# Patient Record
Sex: MALE | Race: WHITE | HISPANIC OR LATINO | URBAN - METROPOLITAN AREA
[De-identification: names, ages, dates, MRNs, and addresses within clinical notes are randomized per-mention and may not be internally consistent; named-entity substitution may affect disease eponyms.]

---

## 2019-02-12 ENCOUNTER — OUTPATIENT (OUTPATIENT)
Dept: OUTPATIENT SERVICES | Facility: HOSPITAL | Age: 54
LOS: 1 days | End: 2019-02-12
Payer: COMMERCIAL

## 2019-02-12 VITALS
TEMPERATURE: 98 F | WEIGHT: 137.79 LBS | RESPIRATION RATE: 20 BRPM | HEIGHT: 64 IN | HEART RATE: 79 BPM | DIASTOLIC BLOOD PRESSURE: 95 MMHG | SYSTOLIC BLOOD PRESSURE: 140 MMHG

## 2019-02-12 DIAGNOSIS — Z01.818 ENCOUNTER FOR OTHER PREPROCEDURAL EXAMINATION: ICD-10-CM

## 2019-02-12 DIAGNOSIS — I10 ESSENTIAL (PRIMARY) HYPERTENSION: ICD-10-CM

## 2019-02-12 DIAGNOSIS — Z90.49 ACQUIRED ABSENCE OF OTHER SPECIFIED PARTS OF DIGESTIVE TRACT: Chronic | ICD-10-CM

## 2019-02-12 DIAGNOSIS — Z12.11 ENCOUNTER FOR SCREENING FOR MALIGNANT NEOPLASM OF COLON: ICD-10-CM

## 2019-02-12 LAB
ANION GAP SERPL CALC-SCNC: 11 MMOL/L — SIGNIFICANT CHANGE UP (ref 5–17)
APTT BLD: 34.2 SEC — SIGNIFICANT CHANGE UP (ref 27.5–36.3)
BASOPHILS # BLD AUTO: 0 K/UL — SIGNIFICANT CHANGE UP (ref 0–0.2)
BUN SERPL-MCNC: 23 MG/DL — HIGH (ref 8–20)
CALCIUM SERPL-MCNC: 9.9 MG/DL — SIGNIFICANT CHANGE UP (ref 8.6–10.2)
CHLORIDE SERPL-SCNC: 101 MMOL/L — SIGNIFICANT CHANGE UP (ref 98–107)
CO2 SERPL-SCNC: 28 MMOL/L — SIGNIFICANT CHANGE UP (ref 22–29)
CREAT SERPL-MCNC: 0.84 MG/DL — SIGNIFICANT CHANGE UP (ref 0.5–1.3)
EOSINOPHIL # BLD AUTO: 0.1 K/UL — SIGNIFICANT CHANGE UP (ref 0–0.5)
EOSINOPHIL NFR BLD AUTO: 2 % — SIGNIFICANT CHANGE UP (ref 0–6)
GLUCOSE SERPL-MCNC: 87 MG/DL — SIGNIFICANT CHANGE UP (ref 70–115)
HCT VFR BLD CALC: 44.1 % — SIGNIFICANT CHANGE UP (ref 42–52)
HGB BLD-MCNC: 15.6 G/DL — SIGNIFICANT CHANGE UP (ref 14–18)
INR BLD: 0.97 RATIO — SIGNIFICANT CHANGE UP (ref 0.88–1.16)
LYMPHOCYTES # BLD AUTO: 3.1 K/UL — SIGNIFICANT CHANGE UP (ref 1–4.8)
LYMPHOCYTES # BLD AUTO: 56 % — HIGH (ref 20–55)
MCHC RBC-ENTMCNC: 28.9 PG — SIGNIFICANT CHANGE UP (ref 27–31)
MCHC RBC-ENTMCNC: 35.4 G/DL — SIGNIFICANT CHANGE UP (ref 32–36)
MCV RBC AUTO: 81.8 FL — SIGNIFICANT CHANGE UP (ref 80–94)
MONOCYTES # BLD AUTO: 0.3 K/UL — SIGNIFICANT CHANGE UP (ref 0–0.8)
MONOCYTES NFR BLD AUTO: 7 % — SIGNIFICANT CHANGE UP (ref 3–10)
NEUTROPHILS # BLD AUTO: 1.8 K/UL — SIGNIFICANT CHANGE UP (ref 1.8–8)
NEUTROPHILS NFR BLD AUTO: 35 % — LOW (ref 37–73)
PLAT MORPH BLD: NORMAL — SIGNIFICANT CHANGE UP
PLATELET # BLD AUTO: 190 K/UL — SIGNIFICANT CHANGE UP (ref 150–400)
POTASSIUM SERPL-MCNC: 4.2 MMOL/L — SIGNIFICANT CHANGE UP (ref 3.5–5.3)
POTASSIUM SERPL-SCNC: 4.2 MMOL/L — SIGNIFICANT CHANGE UP (ref 3.5–5.3)
PROTHROM AB SERPL-ACNC: 11.2 SEC — SIGNIFICANT CHANGE UP (ref 10–12.9)
RBC # BLD: 5.39 M/UL — SIGNIFICANT CHANGE UP (ref 4.6–6.2)
RBC # FLD: 13 % — SIGNIFICANT CHANGE UP (ref 11–15.6)
RBC BLD AUTO: NORMAL — SIGNIFICANT CHANGE UP
SODIUM SERPL-SCNC: 140 MMOL/L — SIGNIFICANT CHANGE UP (ref 135–145)
WBC # BLD: 5.3 K/UL — SIGNIFICANT CHANGE UP (ref 4.8–10.8)
WBC # FLD AUTO: 5.3 K/UL — SIGNIFICANT CHANGE UP (ref 4.8–10.8)

## 2019-02-12 PROCEDURE — 80048 BASIC METABOLIC PNL TOTAL CA: CPT

## 2019-02-12 PROCEDURE — 85027 COMPLETE CBC AUTOMATED: CPT

## 2019-02-12 PROCEDURE — 85610 PROTHROMBIN TIME: CPT

## 2019-02-12 PROCEDURE — 85730 THROMBOPLASTIN TIME PARTIAL: CPT

## 2019-02-12 PROCEDURE — G0463: CPT

## 2019-02-12 PROCEDURE — 36415 COLL VENOUS BLD VENIPUNCTURE: CPT

## 2019-02-12 PROCEDURE — T1013: CPT

## 2019-02-12 NOTE — H&P PST ADULT - NEGATIVE CARDIOVASCULAR SYMPTOMS
no dyspnea on exertion/no chest pain/no palpitations/no peripheral edema/no paroxysmal nocturnal dyspnea/no claudication

## 2019-02-19 ENCOUNTER — OUTPATIENT (OUTPATIENT)
Dept: OUTPATIENT SERVICES | Facility: HOSPITAL | Age: 54
LOS: 1 days | End: 2019-02-19
Payer: COMMERCIAL

## 2019-02-19 DIAGNOSIS — Z01.818 ENCOUNTER FOR OTHER PREPROCEDURAL EXAMINATION: ICD-10-CM

## 2019-02-19 DIAGNOSIS — Z12.11 ENCOUNTER FOR SCREENING FOR MALIGNANT NEOPLASM OF COLON: ICD-10-CM

## 2019-02-19 DIAGNOSIS — Z90.49 ACQUIRED ABSENCE OF OTHER SPECIFIED PARTS OF DIGESTIVE TRACT: Chronic | ICD-10-CM

## 2019-02-19 PROCEDURE — 45378 DIAGNOSTIC COLONOSCOPY: CPT

## 2021-05-04 ENCOUNTER — EMERGENCY (EMERGENCY)
Facility: HOSPITAL | Age: 56
LOS: 1 days | Discharge: HOME | End: 2021-05-04
Attending: EMERGENCY MEDICINE | Admitting: EMERGENCY MEDICINE
Payer: COMMERCIAL

## 2021-05-04 VITALS
WEIGHT: 130.07 LBS | HEART RATE: 87 BPM | TEMPERATURE: 98 F | OXYGEN SATURATION: 97 % | RESPIRATION RATE: 16 BRPM | SYSTOLIC BLOOD PRESSURE: 187 MMHG | HEIGHT: 65 IN | DIASTOLIC BLOOD PRESSURE: 105 MMHG

## 2021-05-04 VITALS
OXYGEN SATURATION: 97 % | RESPIRATION RATE: 18 BRPM | HEART RATE: 82 BPM | SYSTOLIC BLOOD PRESSURE: 155 MMHG | TEMPERATURE: 98 F | DIASTOLIC BLOOD PRESSURE: 86 MMHG

## 2021-05-04 DIAGNOSIS — Z90.49 ACQUIRED ABSENCE OF OTHER SPECIFIED PARTS OF DIGESTIVE TRACT: Chronic | ICD-10-CM

## 2021-05-04 DIAGNOSIS — S61.209A UNSPECIFIED OPEN WOUND OF UNSPECIFIED FINGER WITHOUT DAMAGE TO NAIL, INITIAL ENCOUNTER: ICD-10-CM

## 2021-05-04 LAB
ALBUMIN SERPL ELPH-MCNC: 4.3 G/DL — SIGNIFICANT CHANGE UP (ref 3.3–5)
ALP SERPL-CCNC: 74 U/L — SIGNIFICANT CHANGE UP (ref 40–120)
ALT FLD-CCNC: 36 U/L — SIGNIFICANT CHANGE UP (ref 10–45)
ANION GAP SERPL CALC-SCNC: 8 MMOL/L — SIGNIFICANT CHANGE UP (ref 5–17)
APTT BLD: 33.7 SEC — SIGNIFICANT CHANGE UP (ref 27.5–35.5)
AST SERPL-CCNC: 37 U/L — SIGNIFICANT CHANGE UP (ref 10–40)
BASOPHILS # BLD AUTO: 0.02 K/UL — SIGNIFICANT CHANGE UP (ref 0–0.2)
BASOPHILS NFR BLD AUTO: 0.3 % — SIGNIFICANT CHANGE UP (ref 0–2)
BILIRUB SERPL-MCNC: 0.4 MG/DL — SIGNIFICANT CHANGE UP (ref 0.2–1.2)
BLD GP AB SCN SERPL QL: SIGNIFICANT CHANGE UP
BUN SERPL-MCNC: 18 MG/DL — SIGNIFICANT CHANGE UP (ref 7–23)
CALCIUM SERPL-MCNC: 9.6 MG/DL — SIGNIFICANT CHANGE UP (ref 8.4–10.5)
CHLORIDE SERPL-SCNC: 104 MMOL/L — SIGNIFICANT CHANGE UP (ref 96–108)
CO2 SERPL-SCNC: 29 MMOL/L — SIGNIFICANT CHANGE UP (ref 22–31)
CREAT SERPL-MCNC: 0.86 MG/DL — SIGNIFICANT CHANGE UP (ref 0.5–1.3)
EOSINOPHIL # BLD AUTO: 0.05 K/UL — SIGNIFICANT CHANGE UP (ref 0–0.5)
EOSINOPHIL NFR BLD AUTO: 0.8 % — SIGNIFICANT CHANGE UP (ref 0–6)
GLUCOSE SERPL-MCNC: 106 MG/DL — HIGH (ref 70–99)
HCT VFR BLD CALC: 42.3 % — SIGNIFICANT CHANGE UP (ref 39–50)
HGB BLD-MCNC: 14.4 G/DL — SIGNIFICANT CHANGE UP (ref 13–17)
IMM GRANULOCYTES NFR BLD AUTO: 0.2 % — SIGNIFICANT CHANGE UP (ref 0–1.5)
INR BLD: 1.03 RATIO — SIGNIFICANT CHANGE UP (ref 0.88–1.16)
LYMPHOCYTES # BLD AUTO: 2.2 K/UL — SIGNIFICANT CHANGE UP (ref 1–3.3)
LYMPHOCYTES # BLD AUTO: 36.4 % — SIGNIFICANT CHANGE UP (ref 13–44)
MCHC RBC-ENTMCNC: 28.6 PG — SIGNIFICANT CHANGE UP (ref 27–34)
MCHC RBC-ENTMCNC: 34 GM/DL — SIGNIFICANT CHANGE UP (ref 32–36)
MCV RBC AUTO: 83.9 FL — SIGNIFICANT CHANGE UP (ref 80–100)
MONOCYTES # BLD AUTO: 0.43 K/UL — SIGNIFICANT CHANGE UP (ref 0–0.9)
MONOCYTES NFR BLD AUTO: 7.1 % — SIGNIFICANT CHANGE UP (ref 2–14)
NEUTROPHILS # BLD AUTO: 3.33 K/UL — SIGNIFICANT CHANGE UP (ref 1.8–7.4)
NEUTROPHILS NFR BLD AUTO: 55.2 % — SIGNIFICANT CHANGE UP (ref 43–77)
NRBC # BLD: 0 /100 WBCS — SIGNIFICANT CHANGE UP (ref 0–0)
PLATELET # BLD AUTO: 218 K/UL — SIGNIFICANT CHANGE UP (ref 150–400)
POTASSIUM SERPL-MCNC: 4.4 MMOL/L — SIGNIFICANT CHANGE UP (ref 3.5–5.3)
POTASSIUM SERPL-SCNC: 4.4 MMOL/L — SIGNIFICANT CHANGE UP (ref 3.5–5.3)
PROT SERPL-MCNC: 7.3 G/DL — SIGNIFICANT CHANGE UP (ref 6–8.3)
PROTHROM AB SERPL-ACNC: 12.4 SEC — SIGNIFICANT CHANGE UP (ref 10.6–13.6)
RBC # BLD: 5.04 M/UL — SIGNIFICANT CHANGE UP (ref 4.2–5.8)
RBC # FLD: 12.9 % — SIGNIFICANT CHANGE UP (ref 10.3–14.5)
SODIUM SERPL-SCNC: 141 MMOL/L — SIGNIFICANT CHANGE UP (ref 135–145)
WBC # BLD: 6.04 K/UL — SIGNIFICANT CHANGE UP (ref 3.8–10.5)
WBC # FLD AUTO: 6.04 K/UL — SIGNIFICANT CHANGE UP (ref 3.8–10.5)

## 2021-05-04 PROCEDURE — 99284 EMERGENCY DEPT VISIT MOD MDM: CPT

## 2021-05-04 PROCEDURE — 80053 COMPREHEN METABOLIC PANEL: CPT

## 2021-05-04 PROCEDURE — 73140 X-RAY EXAM OF FINGER(S): CPT

## 2021-05-04 PROCEDURE — 86901 BLOOD TYPING SEROLOGIC RH(D): CPT

## 2021-05-04 PROCEDURE — 86850 RBC ANTIBODY SCREEN: CPT

## 2021-05-04 PROCEDURE — 11760 REPAIR OF NAIL BED: CPT

## 2021-05-04 PROCEDURE — 96365 THER/PROPH/DIAG IV INF INIT: CPT | Mod: XU

## 2021-05-04 PROCEDURE — 96375 TX/PRO/DX INJ NEW DRUG ADDON: CPT

## 2021-05-04 PROCEDURE — 36415 COLL VENOUS BLD VENIPUNCTURE: CPT

## 2021-05-04 PROCEDURE — 85025 COMPLETE CBC W/AUTO DIFF WBC: CPT

## 2021-05-04 PROCEDURE — 86900 BLOOD TYPING SEROLOGIC ABO: CPT

## 2021-05-04 PROCEDURE — 85610 PROTHROMBIN TIME: CPT

## 2021-05-04 PROCEDURE — 85730 THROMBOPLASTIN TIME PARTIAL: CPT

## 2021-05-04 PROCEDURE — 73140 X-RAY EXAM OF FINGER(S): CPT | Mod: 26,RT

## 2021-05-04 PROCEDURE — 99284 EMERGENCY DEPT VISIT MOD MDM: CPT | Mod: 25

## 2021-05-04 PROCEDURE — 90471 IMMUNIZATION ADMIN: CPT

## 2021-05-04 PROCEDURE — 90715 TDAP VACCINE 7 YRS/> IM: CPT

## 2021-05-04 RX ORDER — IBUPROFEN 200 MG
1 TABLET ORAL
Qty: 30 | Refills: 0
Start: 2021-05-04

## 2021-05-04 RX ORDER — AMPICILLIN SODIUM AND SULBACTAM SODIUM 250; 125 MG/ML; MG/ML
3 INJECTION, POWDER, FOR SUSPENSION INTRAMUSCULAR; INTRAVENOUS ONCE
Refills: 0 | Status: COMPLETED | OUTPATIENT
Start: 2021-05-04 | End: 2021-05-04

## 2021-05-04 RX ORDER — MORPHINE SULFATE 50 MG/1
4 CAPSULE, EXTENDED RELEASE ORAL ONCE
Refills: 0 | Status: DISCONTINUED | OUTPATIENT
Start: 2021-05-04 | End: 2021-05-04

## 2021-05-04 RX ORDER — TETANUS TOXOID, REDUCED DIPHTHERIA TOXOID AND ACELLULAR PERTUSSIS VACCINE, ADSORBED 5; 2.5; 8; 8; 2.5 [IU]/.5ML; [IU]/.5ML; UG/.5ML; UG/.5ML; UG/.5ML
0.5 SUSPENSION INTRAMUSCULAR ONCE
Refills: 0 | Status: COMPLETED | OUTPATIENT
Start: 2021-05-04 | End: 2021-05-04

## 2021-05-04 RX ORDER — OXYCODONE HYDROCHLORIDE 5 MG/1
1 TABLET ORAL
Qty: 20 | Refills: 0
Start: 2021-05-04

## 2021-05-04 RX ADMIN — MORPHINE SULFATE 4 MILLIGRAM(S): 50 CAPSULE, EXTENDED RELEASE ORAL at 16:30

## 2021-05-04 RX ADMIN — TETANUS TOXOID, REDUCED DIPHTHERIA TOXOID AND ACELLULAR PERTUSSIS VACCINE, ADSORBED 0.5 MILLILITER(S): 5; 2.5; 8; 8; 2.5 SUSPENSION INTRAMUSCULAR at 16:01

## 2021-05-04 RX ADMIN — MORPHINE SULFATE 4 MILLIGRAM(S): 50 CAPSULE, EXTENDED RELEASE ORAL at 16:12

## 2021-05-04 RX ADMIN — AMPICILLIN SODIUM AND SULBACTAM SODIUM 3 GRAM(S): 250; 125 INJECTION, POWDER, FOR SUSPENSION INTRAMUSCULAR; INTRAVENOUS at 16:30

## 2021-05-04 RX ADMIN — AMPICILLIN SODIUM AND SULBACTAM SODIUM 200 GRAM(S): 250; 125 INJECTION, POWDER, FOR SUSPENSION INTRAMUSCULAR; INTRAVENOUS at 16:00

## 2021-05-04 NOTE — CONSULT NOTE ADULT - PROBLEM SELECTOR RECOMMENDATION 9
Plan to repair the injury in the ED. The patient should receive one week of antibiotics post-procedure and follow-up in the [facility] this Thursday.

## 2021-05-04 NOTE — ED ADULT NURSE NOTE - OBJECTIVE STATEMENT
patient presents to the ed for a right index finger laceration/partial amputation. patient was at work and got his finger stuck in a wood . +numbness, denies sensation of index finger. skin discoloration noted under finger. patient unknown last tetnus. states 3/10 pain. PA at bedside for eval.

## 2021-05-04 NOTE — ED ADULT NURSE REASSESSMENT NOTE - NS ED NURSE REASSESS COMMENT FT1
patient resting in stretcher, complaining of worsening 8/10 pain to right index finger. TY Friend made aware. pending orders. safety maintained. will continue to monitor.

## 2021-05-04 NOTE — ED PROVIDER NOTE - NSFOLLOWUPINSTRUCTIONS_ED_ALL_ED_FT
[place]:  Seguimiento en el [facility] en 5/6 con Dr. Nolasco- [place] para iam denisse al [phone].  [place] Augmentin 875 mg 1 tableta 2 veces al día robel 7 días.  [place] Motrin 600 mg cada 6-8 horas según sea necesario para el dolor con la comida.  Percocet 1 tableta cada 6 horas según sea necesario para el malestar irruptivo; tenga cuidado con la somnolencia mientras christina yanick medicamento; no conduzca ni maneje maquinaria pesada.  Los síntomas que empeoran, continúan o CUALQUIER nuevo síntoma preocupante regresan al departamento de emergencia.    English:  Follow up at the [facility] Thursday on 5/6 with Dr. Nolasco- call for appointment at [phone].  Take Augmentin 875mg 1 tab 2x/day for 7 days.  Take Motrin 600mg every 6-8hrs as needed for pain with food.   Percocet 1 tablet every 6 hrs as needed for breakthrough discomfort- caution drowsiness while taking this medication- do not drive or operate heavy machinery.   Worsening, continued or ANY new concerning symptoms return to the emergency department.       Amputación traumática de un dedo    Traumatic Finger Amputation      La amputación traumática de un dedo se produce cuando iam persona pierde parte o la totalidad de un dedo debido a un accidente o iam lesión. Se trata de iam emergencia médica. Es necesario tratarla de inmediato para evitar más daños en el dedo y para preservar la parte perdida del dedo, si eso es posible.      ¿Cuáles son las causas?  Por lo general, esta afección es consecuencia de un accidente que involucra:  •Un auto.      •Herramientas eléctricas.      •Trabajo en fábricas.      •Equipo agrícola o para cortar césped.        ¿Cuáles son los signos o los síntomas?  Los síntomas de esta afección incluyen los siguientes:  •Hemorragia.      •Dolor.      •Daño en los tejidos circundantes, maximo huesos, músculos, tendones y piel.        ¿Cómo se diagnostica?    Esta afección se diagnostica mediante un examen físico. Robel el examen, el médico determinará la gravedad de la lesión y la mejor manera de tratarla. Se podrán realizar radiografías para verificar si hay daños en los huesos y tejidos circundantes.      ¿Cómo se trata?  Para tratar esta afección se debe limpiar a fondo la herida y administrar analgésicos. El tratamiento adicional depende del tipo y de la gravedad de la lesión:  •Si solo se desprendió la punta del dedo, el tratamiento puede implicar la colocación de un apósito protector (vendaje) sobre la herida y la limpieza de la herida en forma periódica.      •Si la lesión es grave, se puede extraer iam porción de piel de otra parte del cuerpo (injerto) y adherirla al lugar de la herida hasta que esta cicatrice.      •Si se cortó iam porción leona del dedo, el tratamiento puede abarcar un procedimiento quirúrgico para volver a unir el dedo (reimplante).        Siga estas indicaciones en coe casa:    Medicamentos     •[place] los medicamentos de venta kan y los recetados solamente maximo se lo haya indicado el médico.      •Si le recetaron un antibiótico, tómelo maximo se lo haya indicado el médico. No deje de usar el antibiótico, ni siquiera si el cuadro clínico mejora.      • No conduzca ni use maquinaria pesada mientras christina analgésicos recetados.      Cuidado de la herida   •Siga las indicaciones del médico acerca del cuidado de la herida. Ruby lo siguiente:  •Lávese las jeff con agua y jabón antes de cambiar el vendaje. Use desinfectante para jeff si no dispone de agua y jabón.      •Cambie el vendaje maximo se lo haya indicado el médico.      •No retire los puntos (suturas), la goma para cerrar la piel o las tiras adhesivas. Es posible que estos cierres cutáneos deban quedar puestos en la piel robel 2 semanas o más tiempo. Si los bordes de las tiras adhesivas empiezan a despegarse y enroscarse, puede recortar los que están sueltos. No retire las tiras adhesivas por completo a menos que el médico se lo indique.      •Controle la herida todos los días para detectar signos de infección. Esté atento a los siguientes signos:  •Dolor, hinchazón o enrojecimiento.      •Líquido o renato.      •Calor.      •Pus o mal olor.        Instrucciones generales     •Realice ejercicios para fortalecer el dedo y la mano maximo se lo haya indicado el médico.      •Cuando esté en reposo, mantenga la mano elevada por encima del nivel del corazón.        Comuníquese con un médico si:    •La herida no parece estar cicatrizando odalys.      •Tiene enrojecimiento, hinchazón o dolor alrededor de la herida.      •Observa líquido o renato que salen de la herida.      •La herida está caliente al tacto.      •Tiene pus o percibe mal olor que emana de la herida.      •Tiene fiebre.        Solicite ayuda de inmediato si:    •Tiene enrojecimiento que se propaga o extiende desde la herida.        Resumen    •La amputación traumática de un dedo se produce cuando iam persona pierde parte o la totalidad de un dedo debido a un accidente o iam lesión.      •Esta afección se diagnostica mediante un examen físico. Robel el examen, el médico determinará la gravedad de la lesión y la mejor manera de tratarla.      •Siga las indicaciones del médico acerca del cuidado de la herida.      Esta información no tiene maximo fin reemplazar el consejo del médico. Asegúrese de hacerle al médico cualquier pregunta que tenga.

## 2021-05-04 NOTE — ED PROVIDER NOTE - EXTREMITY EXAM
Patient verified her name and . Patient notified of results. Patient verbalized understanding. right upper extremity findings

## 2021-05-04 NOTE — ED PROVIDER NOTE - ATTENDING CONTRIBUTION TO CARE
Cheryl with TY Friend. 57 y/o M no pmh pw partial amputation to the right 2nd digit obtained just PTA with a wood chip saw accompanied with numbness. Right hand dominant. Bleeding controlled upon arrival. Finger perfused. Denies head trauma, other MSK injuries.   Last Tdap- unknown. Partial amputation to the distal 2nd right finger noted (+) perfusion. Decreased sensation. Tendon severed.  Plan: xray hand, Labs, IV ABX, up date Tdap, pain control, Hand Surgeon consult/repair    Dr Norman not available (In Oper. Room) and advised Dr Nolasco to be notified. Dr Nolasco would be available to come attend to the patient.     I performed a face to face bedside interview with patient regarding history of present illness, review of symptoms and past medical history. I completed an independent physical exam.  I have discussed the patient's plan of care with Physician Assistant (PA). I agree with note as stated above, having amended the EMR as needed to reflect my findings.   This includes History of Present Illness, HIV, Past Medical/Surgical/Family/Social History, Allergies and Home Medications, Review of Systems, Physical Exam, and any Progress Notes during the time I functioned as the attending physician for this patient.

## 2021-05-04 NOTE — ED PROVIDER NOTE - CARE PLAN
Principal Discharge DX:	History of partial amputation of right hand   Principal Discharge DX:	Laceration of finger, complicated, initial encounter

## 2021-05-04 NOTE — CONSULT NOTE ADULT - SUBJECTIVE AND OBJECTIVE BOX
· Chief Complaint: The patient is a 56y Male complaining of injury to right index finger.  · HPI Objective Statement: 55 y/o RHD man who works in [employer], complains of injury to his right index finger when he was working with a wood chip saw. Reports feeling numb in the avulsed portion of the finger which appears to be degloved of the bone.    HIV:    HIV Test Questions:  · In accordance with [place] State law, we offer every patient who comes to our ED an HIV test. Would you like to be tested today?	Previously Declined (within the last year)    PAST MEDICAL/SURGICAL/FAMILY/SOCIAL HISTORY:    Past Medical History:  HTN     Past Surgical History:  No significant past surgical history.     Tobacco Usage:  · Tobacco Usage	Never smoker    ALLERGIES AND HOME MEDICATIONS:   Allergies:        Allergies:  	No Known Allergies:     Home Medications:   * Patient Currently Takes Medications as of 04-May-2021 14:51 documented in Structured Notes  · 	BP med: Last Dose Taken:      REVIEW OF SYSTEMS:    Review of Systems:  · CONSTITUTIONAL: no fever and no chills.  · GASTROINTESTINAL: no abdominal pain, no bloating, no constipation, no diarrhea, no nausea and no vomiting.  · MUSCULOSKELETAL: - - -  · Musculoskeletal [+]: right 2nd digit partial amputation  · Musculoskeletal [-]: no back pain, no neck pain  · SKIN: - - -  · Skin [+]: right 2nd digit partial amputation  · NEUROLOGICAL: - - -  · Neurological [+]: numbness  · ROS STATEMENT: all other ROS negative except as per HPI    PHYSICAL EXAM: · SKIN COLOR: avulsion/degloving of right index finger skin and soft tissue with flap based palmarly and extending to an area just proximal to distal interphalangeal joint. There is a significant crush component to the soft tissue, but it appears to be perfused. The nail plate is avulsed off of the nail bed, but is still attached to the degloved skin and soft tissue palmarly.

## 2021-05-04 NOTE — ED PROVIDER NOTE - SKIN COLOR
partial amputation to the distal 2nd right finger. (+) perfusion. Decreased sensation. Tendon severed.

## 2021-05-04 NOTE — CONSULT NOTE ADULT - ASSESSMENT
56-year-old RHD male with degloving avulsion laceration of the right index finger at the level of the distal aspect of the middle phalanx. The avulsed skin appears to be perfused. Stable.

## 2021-05-04 NOTE — ED PROCEDURE NOTE - CPROC ED LACER REPAIR DETAIL1
The wound was explored to base in bloodless field./All foreign material was removed./Nail was reattached./Nail was repaired./Extensive debridement was performed.

## 2021-05-04 NOTE — ED PROVIDER NOTE - PATIENT PORTAL LINK FT
You can access the FollowMyHealth Patient Portal offered by [facility] by registering at the following website: [email or web address]. By joining [facility]’s FollowMyHealth portal, you will also be able to view your health information using other applications (apps) compatible with our system.

## 2021-05-04 NOTE — ED PROVIDER NOTE - PROGRESS NOTE DETAILS
TY Garcia- called Dr. Tom- in the OR- he requested we call Dr. Beltrán- Dr. Alexandra currently in office hours and cannot get here till tonight. Page put out to Dr. Zaman TY Garcia- spoke with Dr. Tom again- he recommended calling Dr. Nolasco [phone]- I left a message on his cell phone TY Garcia- call back from Dr. Nolasco- also currently in the OR but can be here is 2-3 hours. TY Garcia- Dr. Nolasco repaired finger. Requests patient follow up this Thursday 5/6 at the [facility] to see him- information provided and to take Augmentin x 7days, Motrin/percocet for pain. Worsening, continued or ANY new concerning symptoms return to the emergency department.

## 2021-05-04 NOTE — ED PROCEDURE NOTE - PROCEDURE ADDITIONAL DETAILS
1. Digital block performed with 3 cc of 1% xylocaine with 1:100,000 epinephrine.  2. Sharp excisional debridement of skin, subcutaneous fat, and bone of right index finger middle and distal phalanges.  3. Copious irrigation with NSS.  4. Significant particulate matter removed from the finger pulp.  5. Repair of the degloved skin over the bone, in addition to repair of the nail plate and nail bed injuries. The latter having been repaired with 6-0 plain gut suture.

## 2021-05-04 NOTE — ED PROVIDER NOTE - CLINICAL SUMMARY MEDICAL DECISION MAKING FREE TEXT BOX
55 y/o M no pmh pw partial amputation to the right 2nd digit obtained just PTA with a wood chip saw accompanied with numbness. Right hand dominant. Bleeding controlled upon arrival. Finger perfused. Denies head trauma, other MSK injuries.   Last Tdap- unknown. Partial amputation to the distal 2nd right finger noted (+) perfusion. Decreased sensation. Tendon severed.  Plan: Hand surgeon, Labs, IV ABX, pain control 55 y/o M no pmh pw partial amputation to the right 2nd digit obtained just PTA with a wood chip saw accompanied with numbness. Right hand dominant. Bleeding controlled upon arrival. Finger perfused. Denies head trauma, other MSK injuries.   Last Tdap- unknown. Partial amputation to the distal 2nd right finger noted (+) perfusion. Decreased sensation. Tendon severed.  Plan: xray hand, Labs, IV ABX, up date Tdap, pain control, Hand Surgeon consult/repair 55 y/o M no pmh pw partial amputation to the right 2nd digit obtained just PTA with a wood chip saw accompanied with numbness. Right hand dominant. Bleeding controlled upon arrival. Finger perfused. Denies head trauma, other MSK injuries.   Last Tdap- unknown. Partial amputation to the distal 2nd right finger noted (+) perfusion. Decreased sensation. Tendon severed.  Plan: xray hand, Labs, IV ABX, up date Tdap, pain control, Hand Surgeon consult/repair    Dr Norman not available (In Oper. Room) and advised Dr Nolasco to be notified. Dr Nolasco would be available to come attend to the patient. 55 y/o M no pmh pw partial amputation to the right 2nd digit obtained just PTA with a wood chip saw accompanied with numbness. Right hand dominant. Bleeding controlled upon arrival. Finger perfused. Denies head trauma, other MSK injuries.   Last Tdap- unknown. Partial amputation to the distal 2nd right finger noted (+) perfusion. Decreased sensation. Tendon severed.  Plan: xray hand, Labs, IV ABX, up date Tdap, pain control, Hand Surgeon consult/repair    Dr Norman not available (In Oper. Room) and advised Dr Nolasco to be notified. Dr Nolasco would be available to come attend to the patient.    Dr. Nolasco repaired finger. Requests patient follow up this Thursday 5/6 at the [facility] to see him- information provided and to take Augmentin x 7days, Motrin/percocet for pain. Worsening, continued or ANY new concerning symptoms return to the emergency department.

## 2021-05-04 NOTE — ED ADULT NURSE REASSESSMENT NOTE - NS ED NURSE REASSESS COMMENT FT1
Plastic surgeon at bedside for repair of right hand index finger partial amputation at this time. will continue to monitor closely.

## 2021-05-04 NOTE — ED PROVIDER NOTE - OBJECTIVE STATEMENT
57 y/o M no pmh pw partial amputation to the right 2nd digit obtained just PTA with a wood chip saw accompanied with numbness. Right hand dominant. Bleeding controlled upon arrival. Finger perfused. Denies head trauma, other MSK injuries.   Last Tdap- unknown.

## 2021-11-29 PROBLEM — I10 ESSENTIAL (PRIMARY) HYPERTENSION: Chronic | Status: ACTIVE | Noted: 2019-02-12
